# Patient Record
Sex: FEMALE | Race: WHITE | Employment: UNEMPLOYED | URBAN - METROPOLITAN AREA
[De-identification: names, ages, dates, MRNs, and addresses within clinical notes are randomized per-mention and may not be internally consistent; named-entity substitution may affect disease eponyms.]

---

## 2024-10-25 ENCOUNTER — APPOINTMENT (OUTPATIENT)
Dept: CT IMAGING | Facility: CLINIC | Age: 29
End: 2024-10-25
Payer: COMMERCIAL

## 2024-10-25 ENCOUNTER — HOSPITAL ENCOUNTER (EMERGENCY)
Facility: CLINIC | Age: 29
Discharge: HOME | End: 2024-10-25
Attending: STUDENT IN AN ORGANIZED HEALTH CARE EDUCATION/TRAINING PROGRAM
Payer: COMMERCIAL

## 2024-10-25 VITALS
HEIGHT: 59 IN | HEART RATE: 77 BPM | BODY MASS INDEX: 23.18 KG/M2 | DIASTOLIC BLOOD PRESSURE: 74 MMHG | OXYGEN SATURATION: 98 % | TEMPERATURE: 98.3 F | WEIGHT: 115 LBS | RESPIRATION RATE: 16 BRPM | SYSTOLIC BLOOD PRESSURE: 133 MMHG

## 2024-10-25 DIAGNOSIS — S09.90XA CLOSED HEAD INJURY, INITIAL ENCOUNTER: Primary | ICD-10-CM

## 2024-10-25 PROCEDURE — 72125 CT NECK SPINE W/O DYE: CPT

## 2024-10-25 PROCEDURE — 70450 CT HEAD/BRAIN W/O DYE: CPT

## 2024-10-25 PROCEDURE — 99284 EMERGENCY DEPT VISIT MOD MDM: CPT

## 2024-10-25 ASSESSMENT — ENCOUNTER SYMPTOMS
SHORTNESS OF BREATH: 0
DIARRHEA: 0
COUGH: 0
SINUS PAIN: 0
ABDOMINAL PAIN: 0
BACK PAIN: 0
VOMITING: 0
NAUSEA: 0
SORE THROAT: 0

## 2024-10-25 ASSESSMENT — PAIN SCALES - GENERAL: PAINLEVEL_OUTOF10: 4

## 2024-10-25 ASSESSMENT — PAIN - FUNCTIONAL ASSESSMENT: PAIN_FUNCTIONAL_ASSESSMENT: 0-10

## 2024-10-25 NOTE — ED PROVIDER NOTES
[facility]  [address] [facility] [address]  Phone: [phone]        Pt Name: Anamaria Connor  MRN: [id number]  Birthdate 1995  Date of evaluation: 10/25/24    CHIEFCOMPLAINT       Chief Complaint   Patient presents with    Head Injury    Fall       HISTORY OF PRESENT ILLNESS (Location/Symptom, Timing/Onset, Context/Setting, Quality, Duration, Modifying Factors, Severity)      Anamaria Connor is a 29 y.o. female with no pertinent PMH who presents to the ED via private auto with concern for head injury.  Patient reportedly had a fall shortly prior to arrival she was rushing down the stairs when she fell down approximately 8 steps.  Patient has a hematoma to her forehead.  Denies any loss of consciousness.  Patient denies neck or back pain.  Denies chest pain or shortness of breath.  Denies vomiting or diarrhea.  Patient has minor discomfort and abrasions to the right hand and bilateral knees.  There is no numbness or weakness to the arms bilaterally.    PAST MEDICAL / SURGICAL / SOCIAL / FAMILY HISTORY     PMH:  has no past medical history on file.  Surgical History:  has no past surgical history on file.  Social History:    Family History: has no family status information on file.    family history is not on file.  Psychiatric History: None    Allergies: Gluten    Home Medications:   Prior to Admission medications    Not on File       REVIEW OF SYSTEMS  (2-9 systems for level 4, 10 ormore for level 5)      Review of Systems   Constitutional:  Negative for chills and fever.   HENT:  Negative for ear pain, sinus pain and sore throat.    Respiratory:  Negative for cough and shortness of breath.    Cardiovascular:  Negative for chest pain and palpitations.   Gastrointestinal:  Negative for abdominal pain, diarrhea, nausea and vomiting.   Musculoskeletal:  Positive for arthralgias. Negative for back pain and neck pain.   Neurological:  Positive for headaches. Negative for dizziness and numbness.   All other  systems reviewed and are negative.    See HPI.    PHYSICAL EXAM  (up to 7 for level 4, 8 or more for level 5)      INITIAL VITALS:  height is 1.499 m (4' 11\") and weight is 52.2 kg (115 lb). Her oral temperature is 98.3 °F (36.8 °C). Her blood pressure is 133/74 and her pulse is 77. Her respiration is 16 and oxygen saturation is 98%.      Vital signs reviewed.    Physical Exam  Vitals and nursing note reviewed.   Constitutional:       General: She is not in acute distress.     Appearance: Normal appearance. She is not toxic-appearing.   HENT:      Head:        Right Ear: Tympanic membrane normal.      Left Ear: Tympanic membrane normal.      Nose: Nose normal.      Mouth/Throat:      Mouth: Mucous membranes are moist.   Cardiovascular:      Rate and Rhythm: Normal rate.   Pulmonary:      Effort: Pulmonary effort is normal.   Musculoskeletal:      Cervical back: No tenderness.      Thoracic back: No spasms or tenderness.      Lumbar back: No spasms or tenderness.   Neurological:      Mental Status: She is alert.           DIFFERENTIAL DIAGNOSIS / MDM     Closed head injury, intracranial hemorrhage, skull fracture, cervical strain, cervical fracture    PLAN (LABS / IMAGING / EKG):  Orders Placed This Encounter   Procedures    CT Head W/O Contrast    CT CSpine W/O Contrast       MEDICATIONS ORDERED:  No orders of the defined types were placed in this encounter.      Controlled Substances Monitoring:     DIAGNOSTIC RESULTS     EKG: All EKG's are interpreted by the Emergency Department Physician who either signs or Co-signs this chart in the absenceof a cardiologist.        RADIOLOGY: All images are read by the radiologist and their interpretations are reviewed.        CT Head W/O Contrast   Final Result   Small right frontal scalp hematoma. No underlying depressed skull fracture or   acute intracranial abnormality.         CT CSpine W/O Contrast   Preliminary Result   No acute abnormality of the cervical spine.

## 2024-10-25 NOTE — ED PROVIDER NOTES
[facility] ED      Pt Name: Anamaria Connor  MRN: [id number]  Birthdate 1995  Date of evaluation: 10/25/2024    EMERGENCY DEPARTMENT ENCOUNTER      PERTINENT ATTENDING PHYSICIAN COMMENTS:      Faculty Attestation    I performed a history and physical examination of the patient and discussed management with the mid level provideer. I reviewed the mid level provider's note and agree with the documented findings and plan of care.Any areas of disagreement are noted on the chart. I was personally present for the key portions of any procedures. I have documented in the chart those procedures where I was not present during the key portions. I have reviewed the emergency nurses triage note. I agree with the chief complaint, past medical history, past surgical history, allergies, medications, social and family history as documented unless otherwise noted below. Documentation of the HPI, Physical Exam and Medical Decision Making performed by medical students or scribes is based on my personal performance of the HPI, PE and MDM. For Residents/Physician Assistant/ Nurse Practitioner cases/documentation I have personally evaluated this patient and have completed at least one if not all key elements of the E/M (history, physical exam, and MDM). Additional findings are as noted.    CHIEF COMPLAINT       Chief Complaint   Patient presents with    Head Injury    Fall       HISTORY OF PRESENT ILLNESS    Anamaria Connor is a 29 y.o. female who presents to the emergency department after falling down about 8 stairs, she lost her footing fell, did strike her head.  No loss of consciousness.  Not on any anticoagulation, denies any coagulation issues.  Denies any nausea vomiting or vision changes.    PAST MEDICAL HISTORY    has no past medical history on file.    SURGICAL HISTORY      has no past surgical history on file.    CURRENT MEDICATIONS       There are no discharge medications for this patient.      ALLERGIES     is allergic to  gluten.    FAMILY HISTORY     has no family status information on file.      family history is not on file.    SOCIAL HISTORY          PHYSICAL EXAM       ED Triage Vitals [10/25/24 1337]   BP Systolic BP Percentile Diastolic BP Percentile Temp Temp Source Pulse Respirations SpO2   133/74 -- -- 98.3 °F (36.8 °C) Oral 77 16 98 %      Height Weight - Scale         1.499 m (4' 11\") 52.2 kg (115 lb)             No Munroe sign, no raccoon eyes, no hemotympanum bilaterally, patient does have a goose egg to the right forehead.  Pupils equal reactive bilaterally.    DIFFERENTIAL DIAGNOSIS/ MDM:   29-year-old female presents emergency after after fall, fell down 8 steps.  Plan for CT head and CT cervical spine.  This happened about an hour prior to arrival, she denies any symptoms since.  No signs of basilar skull fracture on exam.      DIAGNOSTIC RESULTS     EKG: All EKG's are interpreted by the Emergency Department Physician who either signs or Co-signs this chart in the absence of a cardiologist.        Not indicated unless otherwise documented above    LABS:  No results found for this visit on 10/25/24.    Not indicated unless otherwise documented above    RADIOLOGY:   I reviewed the radiologist interpretations:    CT Head W/O Contrast   Final Result   Small right frontal scalp hematoma. No underlying depressed skull fracture or   acute intracranial abnormality.         CT CSpine W/O Contrast   Preliminary Result   No acute abnormality of the cervical spine.             Not indicated unless otherwise documented above    EMERGENCY DEPARTMENT COURSE:     ED Course as of 10/26/24 0821   Fri Oct 25, 2024   1601 IMPRESSION:  No acute abnormality of the cervical spine.   [SS]      ED Course User Index  [SS] Taya Carpenter MD       The patient was given the following medications:  No orders of the defined types were placed in this encounter.       Vitals:   -------------------------  /74   Pulse 77   Temp 98.3 °F